# Patient Record
Sex: FEMALE | Race: WHITE
[De-identification: names, ages, dates, MRNs, and addresses within clinical notes are randomized per-mention and may not be internally consistent; named-entity substitution may affect disease eponyms.]

---

## 2018-11-24 ENCOUNTER — HOSPITAL ENCOUNTER (EMERGENCY)
Dept: HOSPITAL 12 - ER | Age: 58
Discharge: HOME | End: 2018-11-24
Payer: COMMERCIAL

## 2018-11-24 VITALS — WEIGHT: 120 LBS | BODY MASS INDEX: 19.29 KG/M2 | HEIGHT: 66 IN

## 2018-11-24 DIAGNOSIS — E03.9: ICD-10-CM

## 2018-11-24 DIAGNOSIS — R42: Primary | ICD-10-CM

## 2018-11-24 PROCEDURE — A4663 DIALYSIS BLOOD PRESSURE CUFF: HCPCS

## 2018-11-24 NOTE — NUR
DC, RX AND FOLLOW UP INSTRUCTIONS GIVEN AND EXPLAINED TO PATIENT WHO STATES SHE 
UNDERSTANDS ALL INSTRUCTIONS...